# Patient Record
Sex: FEMALE | Race: WHITE | NOT HISPANIC OR LATINO
[De-identification: names, ages, dates, MRNs, and addresses within clinical notes are randomized per-mention and may not be internally consistent; named-entity substitution may affect disease eponyms.]

---

## 2017-07-18 ENCOUNTER — RX ONLY (RX ONLY)
Age: 77
End: 2017-07-18

## 2018-02-27 ENCOUNTER — APPOINTMENT (OUTPATIENT)
Dept: URBAN - METROPOLITAN AREA CLINIC 186 | Age: 78
Setting detail: DERMATOLOGY
End: 2018-02-27

## 2018-02-27 DIAGNOSIS — I87.2 VENOUS INSUFFICIENCY (CHRONIC) (PERIPHERAL): ICD-10-CM

## 2018-02-27 DIAGNOSIS — L98429 CHRONIC ULCER OF OTHER SPECIFIED SITES: ICD-10-CM

## 2018-02-27 DIAGNOSIS — L98419 CHRONIC ULCER OF OTHER SPECIFIED SITES: ICD-10-CM

## 2018-02-27 PROBLEM — L30.9 DERMATITIS, UNSPECIFIED: Status: ACTIVE | Noted: 2018-02-27

## 2018-02-27 PROBLEM — L97.529 NON-PRESSURE CHRONIC ULCER OF OTHER PART OF LEFT FOOT WITH UNSPECIFIED SEVERITY: Status: ACTIVE | Noted: 2018-02-27

## 2018-02-27 PROCEDURE — OTHER COUNSELING: OTHER

## 2018-02-27 PROCEDURE — OTHER ORDER TESTS: OTHER

## 2018-02-27 PROCEDURE — OTHER ADDITIONAL NOTES: OTHER

## 2018-02-27 PROCEDURE — 99213 OFFICE O/P EST LOW 20 MIN: CPT

## 2018-02-27 ASSESSMENT — LOCATION SIMPLE DESCRIPTION DERM
LOCATION SIMPLE: RIGHT PRETIBIAL REGION
LOCATION SIMPLE: LEFT 2ND TOE
LOCATION SIMPLE: LEFT PRETIBIAL REGION

## 2018-02-27 ASSESSMENT — LOCATION DETAILED DESCRIPTION DERM
LOCATION DETAILED: LEFT DORSAL 2ND TOE
LOCATION DETAILED: RIGHT DISTAL PRETIBIAL REGION
LOCATION DETAILED: LEFT DISTAL PRETIBIAL REGION

## 2018-02-27 ASSESSMENT — LOCATION ZONE DERM
LOCATION ZONE: TOE
LOCATION ZONE: LEG

## 2018-02-27 NOTE — PROCEDURE: ADDITIONAL NOTES
Additional Notes: I doubt this is a primary bullous disorder, She is complaining of burning pain, however has had neuropathy for years.  I am concerned about a possible secondary infection due to her diabetes, and lack of attention to her chronic pedal edema, and difficulty with daily cleaning of the sites.  She was counseled regrading the importance of washing and drying in between the toes.  I suggested she follow up with primary care physician addressing pedal edema, and neurology for chronic neuropathic pain.\\nI emphasized compression and wound care today.

## 2018-02-27 NOTE — HPI: RASH (STASIS DERMATITIS)
How Severe Is It?: severe
Is This A New Presentation, Or A Follow-Up?: Follow Up Stasis Dermatitis
Additional History: Wound care notes from [facility] wound care attached

## 2018-02-28 ENCOUNTER — RX ONLY (RX ONLY)
Age: 78
End: 2018-02-28

## 2018-02-28 RX ORDER — IODOQUINOL, HYDROCORTISONE ACETATE AND ALOE VERA LEAF 10; 20; 10 MG/G; MG/G; MG/G
1 APPLICATION GEL TOPICAL TWICE DAILY
Qty: 48 | Refills: 1 | Status: ERX

## 2018-03-20 ENCOUNTER — RX ONLY (RX ONLY)
Age: 78
End: 2018-03-20

## 2018-03-20 ENCOUNTER — APPOINTMENT (OUTPATIENT)
Dept: URBAN - METROPOLITAN AREA CLINIC 186 | Age: 78
Setting detail: DERMATOLOGY
End: 2018-03-20

## 2018-03-20 VITALS — HEIGHT: 64 IN | WEIGHT: 198 LBS

## 2018-03-20 DIAGNOSIS — I83019 VARICOSE VEINS OF LOWER EXTREMITIES WITH ULCER: ICD-10-CM

## 2018-03-20 DIAGNOSIS — I83009 VARICOSE VEINS OF LOWER EXTREMITIES WITH ULCER: ICD-10-CM

## 2018-03-20 DIAGNOSIS — I87.2 VENOUS INSUFFICIENCY (CHRONIC) (PERIPHERAL): ICD-10-CM

## 2018-03-20 DIAGNOSIS — I83029 VARICOSE VEINS OF LOWER EXTREMITIES WITH ULCER: ICD-10-CM

## 2018-03-20 PROBLEM — I83.025 VARICOSE VEINS OF LEFT LOWER EXTREMITY WITH ULCER OTHER PART OF FOOT: Status: ACTIVE | Noted: 2018-03-20

## 2018-03-20 PROBLEM — I83.11 VARICOSE VEINS OF RIGHT LOWER EXTREMITY WITH INFLAMMATION: Status: ACTIVE | Noted: 2018-03-20

## 2018-03-20 PROBLEM — I83.12 VARICOSE VEINS OF LEFT LOWER EXTREMITY WITH INFLAMMATION: Status: ACTIVE | Noted: 2018-03-20

## 2018-03-20 PROCEDURE — OTHER PRESCRIPTION: OTHER

## 2018-03-20 PROCEDURE — OTHER ADDITIONAL NOTES: OTHER

## 2018-03-20 PROCEDURE — OTHER COUNSELING: OTHER

## 2018-03-20 PROCEDURE — 99213 OFFICE O/P EST LOW 20 MIN: CPT

## 2018-03-20 PROCEDURE — OTHER ORDER TESTS: OTHER

## 2018-03-20 RX ORDER — SULFAMETHOXAZOLE AND TRIMETHOPRIM 800; 160 MG/1; MG/1
TABLET ORAL
Qty: 20 | Refills: 0 | Status: ERX

## 2018-03-20 ASSESSMENT — LOCATION ZONE DERM
LOCATION ZONE: TOE
LOCATION ZONE: LEG

## 2018-03-20 ASSESSMENT — LOCATION SIMPLE DESCRIPTION DERM
LOCATION SIMPLE: RIGHT ANKLE
LOCATION SIMPLE: LEFT PRETIBIAL REGION
LOCATION SIMPLE: LEFT 4TH TOE
LOCATION SIMPLE: LEFT 2ND TOE
LOCATION SIMPLE: LEFT 3RD TOE

## 2018-03-20 ASSESSMENT — LOCATION DETAILED DESCRIPTION DERM
LOCATION DETAILED: RIGHT ANKLE
LOCATION DETAILED: LEFT DORSAL 3RD TOE
LOCATION DETAILED: LEFT DORSAL 2ND TOE
LOCATION DETAILED: LEFT DISTAL PRETIBIAL REGION
LOCATION DETAILED: LEFT DORSAL 4TH TOE

## 2018-03-20 NOTE — PROCEDURE: ADDITIONAL NOTES
Additional Notes: She is not happy with her current wound clinic.  I suggested referral to [facility] wound care clinic Additional Notes: She is not happy with her current wound clinic.  I suggested referral to [facility] wound care clinic

## 2018-05-09 ENCOUNTER — APPOINTMENT (OUTPATIENT)
Dept: URBAN - METROPOLITAN AREA CLINIC 186 | Age: 78
Setting detail: DERMATOLOGY
End: 2018-05-09

## 2018-05-09 DIAGNOSIS — F42.4 EXCORIATION (SKIN-PICKING) DISORDER: ICD-10-CM

## 2018-05-09 PROBLEM — S50.912A UNSPECIFIED SUPERFICIAL INJURY OF LEFT FOREARM, INITIAL ENCOUNTER: Status: ACTIVE | Noted: 2018-05-09

## 2018-05-09 PROBLEM — S50.911A UNSPECIFIED SUPERFICIAL INJURY OF RIGHT FOREARM, INITIAL ENCOUNTER: Status: ACTIVE | Noted: 2018-05-09

## 2018-05-09 PROCEDURE — OTHER DIAGNOSIS COMMENT: OTHER

## 2018-05-09 PROCEDURE — OTHER PRESCRIPTION: OTHER

## 2018-05-09 PROCEDURE — 99213 OFFICE O/P EST LOW 20 MIN: CPT

## 2018-05-09 PROCEDURE — OTHER COUNSELING: OTHER

## 2018-05-09 RX ORDER — TRIAMCINOLONE ACETONIDE 1 MG/G
CREAM TOPICAL BID
Qty: 1 | Refills: 3 | Status: ERX

## 2018-05-09 ASSESSMENT — LOCATION DETAILED DESCRIPTION DERM
LOCATION DETAILED: RIGHT PROXIMAL DORSAL FOREARM
LOCATION DETAILED: LEFT PROXIMAL DORSAL FOREARM

## 2018-05-09 ASSESSMENT — LOCATION SIMPLE DESCRIPTION DERM
LOCATION SIMPLE: RIGHT FOREARM
LOCATION SIMPLE: LEFT FOREARM

## 2018-05-09 ASSESSMENT — LOCATION ZONE DERM: LOCATION ZONE: ARM

## 2018-05-09 NOTE — PROCEDURE: DIAGNOSIS COMMENT
Detail Level: Zone
Comment: Clinically numerous excoriations. No primary lesions present today.  Discussed with patient primary vs secondary.  Discussed ddx including papular eczema, drug eruption, arthropod folliculitis etc. discussed difficult to discern with current clinical. Discussed biopsy patient defers.  She stopped lyrics approx 2weeks ago.  Will switch cleansers and emollients and start TAC cream. Re-evaluate in 3-4 weeks if no improvement. ED if lips or tongue swelling and difficulty breathing

## 2018-07-20 ENCOUNTER — APPOINTMENT (OUTPATIENT)
Dept: URBAN - METROPOLITAN AREA CLINIC 186 | Age: 78
Setting detail: DERMATOLOGY
End: 2018-07-20

## 2018-07-20 VITALS — HEIGHT: 64.5 IN | WEIGHT: 198 LBS

## 2018-07-20 DIAGNOSIS — L82.1 OTHER SEBORRHEIC KERATOSIS: ICD-10-CM

## 2018-07-20 DIAGNOSIS — L28.1 PRURIGO NODULARIS: ICD-10-CM

## 2018-07-20 DIAGNOSIS — L81.4 OTHER MELANIN HYPERPIGMENTATION: ICD-10-CM

## 2018-07-20 DIAGNOSIS — L98419 CHRONIC ULCER OF OTHER SPECIFIED SITES: ICD-10-CM

## 2018-07-20 DIAGNOSIS — L98429 CHRONIC ULCER OF OTHER SPECIFIED SITES: ICD-10-CM

## 2018-07-20 DIAGNOSIS — D22 MELANOCYTIC NEVI: ICD-10-CM

## 2018-07-20 DIAGNOSIS — D18.0 HEMANGIOMA: ICD-10-CM

## 2018-07-20 DIAGNOSIS — Z85.828 PERSONAL HISTORY OF OTHER MALIGNANT NEOPLASM OF SKIN: ICD-10-CM

## 2018-07-20 DIAGNOSIS — L82.0 INFLAMED SEBORRHEIC KERATOSIS: ICD-10-CM

## 2018-07-20 PROBLEM — D22.5 MELANOCYTIC NEVI OF TRUNK: Status: ACTIVE | Noted: 2018-07-20

## 2018-07-20 PROBLEM — D18.01 HEMANGIOMA OF SKIN AND SUBCUTANEOUS TISSUE: Status: ACTIVE | Noted: 2018-07-20

## 2018-07-20 PROBLEM — L97.529 NON-PRESSURE CHRONIC ULCER OF OTHER PART OF LEFT FOOT WITH UNSPECIFIED SEVERITY: Status: ACTIVE | Noted: 2018-07-20

## 2018-07-20 PROBLEM — D22.61 MELANOCYTIC NEVI OF RIGHT UPPER LIMB, INCLUDING SHOULDER: Status: ACTIVE | Noted: 2018-07-20

## 2018-07-20 PROBLEM — D22.39 MELANOCYTIC NEVI OF OTHER PARTS OF FACE: Status: ACTIVE | Noted: 2018-07-20

## 2018-07-20 PROCEDURE — OTHER SUNSCREEN RECOMMENDATIONS: OTHER

## 2018-07-20 PROCEDURE — 99214 OFFICE O/P EST MOD 30 MIN: CPT | Mod: 25

## 2018-07-20 PROCEDURE — OTHER COUNSELING: OTHER

## 2018-07-20 PROCEDURE — OTHER REASSURANCE: OTHER

## 2018-07-20 PROCEDURE — OTHER TREATMENT REGIMEN: OTHER

## 2018-07-20 PROCEDURE — 17110 DESTRUCT B9 LESION 1-14: CPT

## 2018-07-20 PROCEDURE — OTHER DIAGNOSIS COMMENT: OTHER

## 2018-07-20 PROCEDURE — OTHER BENIGN DESTRUCTION: OTHER

## 2018-07-20 ASSESSMENT — LOCATION DETAILED DESCRIPTION DERM
LOCATION DETAILED: RIGHT INFERIOR CENTRAL MALAR CHEEK
LOCATION DETAILED: LEFT ANTERIOR PROXIMAL THIGH
LOCATION DETAILED: LEFT PLANTAR FOREFOOT OVERLYING 3RD METATARSAL
LOCATION DETAILED: UPPER STERNUM
LOCATION DETAILED: LEFT MEDIAL SUPERIOR CHEST
LOCATION DETAILED: LEFT BUTTOCK
LOCATION DETAILED: RIGHT LATERAL ABDOMEN
LOCATION DETAILED: LEFT MEDIAL PLANTAR 5TH TOE
LOCATION DETAILED: LEFT SUPERIOR UPPER BACK
LOCATION DETAILED: LEFT MID-UPPER BACK
LOCATION DETAILED: LEFT INFERIOR UPPER BACK
LOCATION DETAILED: RIGHT SUPERIOR UPPER BACK
LOCATION DETAILED: RIGHT ANTERIOR PROXIMAL UPPER ARM
LOCATION DETAILED: LEFT INFERIOR LATERAL UPPER BACK
LOCATION DETAILED: RIGHT PROXIMAL LATERAL POSTERIOR UPPER ARM
LOCATION DETAILED: LEFT ANTERIOR PROXIMAL UPPER ARM
LOCATION DETAILED: PERIUMBILICAL SKIN
LOCATION DETAILED: EPIGASTRIC SKIN
LOCATION DETAILED: RIGHT RIB CAGE
LOCATION DETAILED: RIGHT RIB CAGE
LOCATION DETAILED: LEFT ANTERIOR PROXIMAL THIGH
LOCATION DETAILED: RIGHT ANTERIOR DISTAL UPPER ARM
LOCATION DETAILED: LEFT PLANTAR FOREFOOT OVERLYING 2ND METATARSAL

## 2018-07-20 ASSESSMENT — LOCATION SIMPLE DESCRIPTION DERM
LOCATION SIMPLE: LEFT THIGH
LOCATION SIMPLE: LEFT UPPER BACK
LOCATION SIMPLE: RIGHT POSTERIOR UPPER ARM
LOCATION SIMPLE: RIGHT UPPER BACK
LOCATION SIMPLE: ABDOMEN
LOCATION SIMPLE: LEFT PLANTAR SURFACE
LOCATION SIMPLE: LEFT UPPER ARM
LOCATION SIMPLE: LEFT THIGH
LOCATION SIMPLE: RIGHT CHEEK
LOCATION SIMPLE: CHEST
LOCATION SIMPLE: PLANTAR SURFACE OF LEFT 5TH TOE
LOCATION SIMPLE: LEFT UPPER BACK
LOCATION SIMPLE: LEFT BUTTOCK
LOCATION SIMPLE: CHEST
LOCATION SIMPLE: ABDOMEN
LOCATION SIMPLE: RIGHT UPPER ARM

## 2018-07-20 ASSESSMENT — LOCATION ZONE DERM
LOCATION ZONE: ARM
LOCATION ZONE: LEG
LOCATION ZONE: FACE
LOCATION ZONE: TRUNK
LOCATION ZONE: LEG
LOCATION ZONE: TOE
LOCATION ZONE: FEET
LOCATION ZONE: TRUNK

## 2018-07-20 NOTE — HPI: EVALUATION OF SKIN LESION(S)
How Severe Are Your Spot(S)?: mild
Have Your Spot(S) Been Treated In The Past?: has not been treated
Hpi Title: Evaluation of a Skin Lesion
Hpi Title: Evaluation of Skin Lesions
How Severe Are Your Spot(S)?: moderate
Have Your Spot(S) Been Treated In The Past?: has been treated

## 2018-07-20 NOTE — PROCEDURE: DIAGNOSIS COMMENT
Comment: She is followed by [facility]’s Wound care clinic. Comment: She is followed by [facility]’s Wound care clinic.

## 2018-07-20 NOTE — PROCEDURE: BENIGN DESTRUCTION
Detail Level: Zone
Medical Necessity Clause: This procedure was medically necessary because the lesions that were treated were:
Anesthesia Volume In Cc: 0.5
Render Post-Care Instructions In Note?: no
Consent: The patient's consent was obtained including but not limited to risks of crusting, scabbing, blistering, scarring, darker or lighter pigmentary change, recurrence, incomplete removal and infection.
Post-Care Instructions: I reviewed with the patient in detail post-care instructions. Patient is to wear sunprotection, and avoid picking at any of the treated lesions. Pt may apply Vaseline to crusted or scabbing areas.
Total Number Of Lesions Treated: 2
Medical Necessity Information: It is in your best interest to select a reason for this procedure from the list below. All of these items fulfill various CMS LCD requirements except the new and changing color options.

## 2018-11-09 ENCOUNTER — APPOINTMENT (OUTPATIENT)
Dept: URBAN - METROPOLITAN AREA CLINIC 186 | Age: 78
Setting detail: DERMATOLOGY
End: 2018-11-09

## 2018-11-09 DIAGNOSIS — L28.1 PRURIGO NODULARIS: ICD-10-CM

## 2018-11-09 DIAGNOSIS — L82.0 INFLAMED SEBORRHEIC KERATOSIS: ICD-10-CM

## 2018-11-09 DIAGNOSIS — D18.0 HEMANGIOMA: ICD-10-CM

## 2018-11-09 DIAGNOSIS — L81.4 OTHER MELANIN HYPERPIGMENTATION: ICD-10-CM

## 2018-11-09 DIAGNOSIS — Z85.828 PERSONAL HISTORY OF OTHER MALIGNANT NEOPLASM OF SKIN: ICD-10-CM

## 2018-11-09 DIAGNOSIS — L82.1 OTHER SEBORRHEIC KERATOSIS: ICD-10-CM

## 2018-11-09 DIAGNOSIS — D22 MELANOCYTIC NEVI: ICD-10-CM

## 2018-11-09 DIAGNOSIS — L57.8 OTHER SKIN CHANGES DUE TO CHRONIC EXPOSURE TO NONIONIZING RADIATION: ICD-10-CM

## 2018-11-09 PROBLEM — D22.62 MELANOCYTIC NEVI OF LEFT UPPER LIMB, INCLUDING SHOULDER: Status: ACTIVE | Noted: 2018-11-09

## 2018-11-09 PROBLEM — D22.72 MELANOCYTIC NEVI OF LEFT LOWER LIMB, INCLUDING HIP: Status: ACTIVE | Noted: 2018-11-09

## 2018-11-09 PROBLEM — D18.01 HEMANGIOMA OF SKIN AND SUBCUTANEOUS TISSUE: Status: ACTIVE | Noted: 2018-11-09

## 2018-11-09 PROBLEM — D22.5 MELANOCYTIC NEVI OF TRUNK: Status: ACTIVE | Noted: 2018-11-09

## 2018-11-09 PROBLEM — D22.61 MELANOCYTIC NEVI OF RIGHT UPPER LIMB, INCLUDING SHOULDER: Status: ACTIVE | Noted: 2018-11-09

## 2018-11-09 PROBLEM — D22.71 MELANOCYTIC NEVI OF RIGHT LOWER LIMB, INCLUDING HIP: Status: ACTIVE | Noted: 2018-11-09

## 2018-11-09 PROCEDURE — OTHER BENIGN DESTRUCTION: OTHER

## 2018-11-09 PROCEDURE — OTHER PRESCRIPTION: OTHER

## 2018-11-09 PROCEDURE — 17110 DESTRUCT B9 LESION 1-14: CPT

## 2018-11-09 PROCEDURE — OTHER DEFER: OTHER

## 2018-11-09 PROCEDURE — 99214 OFFICE O/P EST MOD 30 MIN: CPT | Mod: 25

## 2018-11-09 PROCEDURE — OTHER TREATMENT REGIMEN: OTHER

## 2018-11-09 PROCEDURE — OTHER COUNSELING: OTHER

## 2018-11-09 RX ORDER — TRIAMCINOLONE ACETONIDE 1 MG/G
CREAM TOPICAL BID
Qty: 1 | Refills: 1 | Status: ERX

## 2018-11-09 ASSESSMENT — LOCATION DETAILED DESCRIPTION DERM
LOCATION DETAILED: RIGHT SUPERIOR LATERAL UPPER BACK
LOCATION DETAILED: LEFT MEDIAL SUPERIOR CHEST
LOCATION DETAILED: RIGHT PROXIMAL POSTERIOR UPPER ARM
LOCATION DETAILED: LEFT ANTERIOR PROXIMAL UPPER ARM
LOCATION DETAILED: RIGHT CENTRAL EYEBROW
LOCATION DETAILED: LEFT ANTERIOR DISTAL THIGH
LOCATION DETAILED: RIGHT ANTERIOR PROXIMAL UPPER ARM
LOCATION DETAILED: LEFT SUPERIOR MEDIAL UPPER BACK
LOCATION DETAILED: LEFT PROXIMAL DORSAL FOREARM
LOCATION DETAILED: RIGHT ANTERIOR PROXIMAL THIGH
LOCATION DETAILED: UPPER STERNUM
LOCATION DETAILED: EPIGASTRIC SKIN
LOCATION DETAILED: LEFT SUPERIOR LATERAL MIDBACK
LOCATION DETAILED: RIGHT LATERAL SUPERIOR CHEST
LOCATION DETAILED: RIGHT INFERIOR LATERAL NECK
LOCATION DETAILED: RIGHT PROXIMAL LATERAL POSTERIOR UPPER ARM
LOCATION DETAILED: LEFT DISTAL POSTERIOR UPPER ARM
LOCATION DETAILED: MIDDLE STERNUM
LOCATION DETAILED: INFERIOR THORACIC SPINE

## 2018-11-09 ASSESSMENT — LOCATION SIMPLE DESCRIPTION DERM
LOCATION SIMPLE: UPPER BACK
LOCATION SIMPLE: LEFT POSTERIOR UPPER ARM
LOCATION SIMPLE: LEFT UPPER BACK
LOCATION SIMPLE: LEFT UPPER ARM
LOCATION SIMPLE: RIGHT POSTERIOR UPPER ARM
LOCATION SIMPLE: CHEST
LOCATION SIMPLE: RIGHT UPPER ARM
LOCATION SIMPLE: LEFT FOREARM
LOCATION SIMPLE: RIGHT ANTERIOR NECK
LOCATION SIMPLE: RIGHT BACK
LOCATION SIMPLE: ABDOMEN
LOCATION SIMPLE: LEFT LOWER BACK
LOCATION SIMPLE: RIGHT THIGH
LOCATION SIMPLE: LEFT THIGH
LOCATION SIMPLE: RIGHT EYEBROW

## 2018-11-09 ASSESSMENT — LOCATION ZONE DERM
LOCATION ZONE: ARM
LOCATION ZONE: TRUNK
LOCATION ZONE: NECK
LOCATION ZONE: FACE
LOCATION ZONE: LEG

## 2018-12-03 ENCOUNTER — APPOINTMENT (OUTPATIENT)
Dept: URBAN - METROPOLITAN AREA CLINIC 186 | Age: 78
Setting detail: DERMATOLOGY
End: 2018-12-03

## 2018-12-03 DIAGNOSIS — L82.0 INFLAMED SEBORRHEIC KERATOSIS: ICD-10-CM

## 2018-12-03 PROCEDURE — OTHER COUNSELING: OTHER

## 2018-12-03 PROCEDURE — 17110 DESTRUCT B9 LESION 1-14: CPT

## 2018-12-03 PROCEDURE — OTHER CURETTAGE AND DESTRUCTION: OTHER

## 2018-12-03 ASSESSMENT — LOCATION DETAILED DESCRIPTION DERM
LOCATION DETAILED: LEFT SUPERIOR UPPER BACK
LOCATION DETAILED: LEFT INFERIOR LATERAL MIDBACK

## 2018-12-03 ASSESSMENT — LOCATION ZONE DERM: LOCATION ZONE: TRUNK

## 2018-12-03 ASSESSMENT — LOCATION SIMPLE DESCRIPTION DERM
LOCATION SIMPLE: LEFT LOWER BACK
LOCATION SIMPLE: LEFT UPPER BACK

## 2018-12-03 NOTE — PROCEDURE: CURETTAGE AND DESTRUCTION
Add 52 Modifier (Optional): no
Anesthesia Type: 1% lidocaine with 1:100,000 epinephrine and a 1:10 solution of 8.4% sodium bicarbonate
Body Location Override (Optional - Billing Will Still Be Based On Selected Body Map Location If Applicable): left lower back
Consent was obtained from the patient. The risks, benefits and alternatives to therapy were discussed in detail. Specifically, the risks of infection, scarring, bleeding, prolonged wound healing, nerve injury, incomplete removal, allergy to anesthesia and recurrence were addressed. Alternatives to ED&C, such as: surgical removal and XRT were also discussed.  Prior to the procedure, the treatment site was clearly identified and confirmed by the patient. All components of Universal Protocol/PAUSE Rule completed.
What Was Performed First?: Curettage
Number Of Curettages: 1
Cautery Type: drysol
Anesthesia Volume In Cc: 0.3
Body Location Override (Optional - Billing Will Still Be Based On Selected Body Map Location If Applicable): left upper back
Detail Level: Detailed
Additional Information: (Optional): The wound was cleaned, and a pressure dressing was applied.  The patient received detailed post-op instructions.
Size Of Lesion After Curettage: 0.2
Post-Care Instructions: I reviewed with the patient in detail post-care instructions. Patient is to keep the area dry for 48 hours, and not to engage in any swimming until the area is healed. Should the patient develop any fevers, chills, bleeding, severe pain patient will contact the office immediately.

## 2019-02-18 ENCOUNTER — APPOINTMENT (OUTPATIENT)
Dept: URBAN - METROPOLITAN AREA CLINIC 186 | Age: 79
Setting detail: DERMATOLOGY
End: 2019-02-18

## 2019-02-18 DIAGNOSIS — L20.84 INTRINSIC (ALLERGIC) ECZEMA: ICD-10-CM

## 2019-02-18 DIAGNOSIS — L30.3 INFECTIVE DERMATITIS: ICD-10-CM

## 2019-02-18 DIAGNOSIS — L71.0 PERIORAL DERMATITIS: ICD-10-CM

## 2019-02-18 DIAGNOSIS — I87.2 VENOUS INSUFFICIENCY (CHRONIC) (PERIPHERAL): ICD-10-CM

## 2019-02-18 PROBLEM — L08.9 LOCAL INFECTION OF THE SKIN AND SUBCUTANEOUS TISSUE, UNSPECIFIED: Status: ACTIVE | Noted: 2019-02-18

## 2019-02-18 PROCEDURE — OTHER ADDITIONAL NOTES: OTHER

## 2019-02-18 PROCEDURE — OTHER COUNSELING: OTHER

## 2019-02-18 PROCEDURE — OTHER PRESCRIPTION: OTHER

## 2019-02-18 PROCEDURE — 99214 OFFICE O/P EST MOD 30 MIN: CPT

## 2019-02-18 PROCEDURE — OTHER ORDER TESTS: OTHER

## 2019-02-18 PROCEDURE — OTHER TREATMENT REGIMEN: OTHER

## 2019-02-18 RX ORDER — METRONIDAZOLE 7.5 MG/G
CREAM TOPICAL
Qty: 1 | Refills: 1 | Status: ERX | COMMUNITY
Start: 2019-02-18

## 2019-02-18 RX ORDER — CEPHALEXIN 500 MG/1
CAPSULE ORAL
Qty: 20 | Refills: 0 | Status: ERX

## 2019-02-18 RX ORDER — TRIAMCINOLONE ACETONIDE 1 MG/G
CREAM TOPICAL BID
Qty: 1 | Refills: 3 | Status: ERX

## 2019-02-18 ASSESSMENT — LOCATION DETAILED DESCRIPTION DERM
LOCATION DETAILED: LEFT DORSAL GREAT TOE
LOCATION DETAILED: LEFT DISTAL PRETIBIAL REGION
LOCATION DETAILED: LEFT PROXIMAL PRETIBIAL REGION
LOCATION DETAILED: RIGHT MEDIAL SUPERIOR CHEST
LOCATION DETAILED: LEFT MEDIAL UPPER BACK
LOCATION DETAILED: LEFT MEDIAL BREAST 11-12:00 REGION
LOCATION DETAILED: RIGHT LOWER CUTANEOUS LIP
LOCATION DETAILED: RIGHT DISTAL DORSAL FOREARM
LOCATION DETAILED: RIGHT PROXIMAL PRETIBIAL REGION
LOCATION DETAILED: RIGHT SUPERIOR VERMILION LIP
LOCATION DETAILED: LEFT LOWER CUTANEOUS LIP
LOCATION DETAILED: RIGHT DISTAL PRETIBIAL REGION
LOCATION DETAILED: LEFT PROXIMAL DORSAL FOREARM

## 2019-02-18 ASSESSMENT — LOCATION SIMPLE DESCRIPTION DERM
LOCATION SIMPLE: LEFT PRETIBIAL REGION
LOCATION SIMPLE: LEFT GREAT TOE
LOCATION SIMPLE: RIGHT LIP
LOCATION SIMPLE: LEFT UPPER BACK
LOCATION SIMPLE: RIGHT PRETIBIAL REGION
LOCATION SIMPLE: LEFT FOREARM
LOCATION SIMPLE: RIGHT FOREARM
LOCATION SIMPLE: LEFT LIP
LOCATION SIMPLE: CHEST
LOCATION SIMPLE: LEFT BREAST

## 2019-02-18 ASSESSMENT — LOCATION ZONE DERM
LOCATION ZONE: LEG
LOCATION ZONE: TOE
LOCATION ZONE: TRUNK
LOCATION ZONE: ARM
LOCATION ZONE: LIP

## 2019-02-18 NOTE — HPI: RASH
What Type Of Note Output Would You Prefer (Optional)?: Bullet Format
How Severe Is Your Rash?: moderate
Is This A New Presentation, Or A Follow-Up?: Rash
Additional History: This area was being treated by wound clinic, they cultured it and patient states it came back as fungal. She was started on Ketoconazole 2% and Silver Sulfadiazine cream. Area still spreading and not getting better.

## 2019-02-18 NOTE — PROCEDURE: TREATMENT REGIMEN
Show Cetaphil Line: Yes
Detail Level: Zone
Action 4: Continue
Show Vanicream Line: No
Start Regimen: Triamcinolone twice daily for up to two weeks
Discontinue Regimen: Desonide
Start Regimen: Keflex twice daily x 10 days\\nTriamcinolone twice daily for 2 weeks

## 2019-02-21 ENCOUNTER — RX ONLY (RX ONLY)
Age: 79
End: 2019-02-21

## 2019-02-21 RX ORDER — DOXYCYCLINE HYCLATE 100 MG/1
1 TABLET TABLET, COATED ORAL TWICE DAILY
Qty: 20 | Refills: 0 | Status: ERX

## 2019-04-02 ENCOUNTER — APPOINTMENT (OUTPATIENT)
Dept: URBAN - METROPOLITAN AREA CLINIC 186 | Age: 79
Setting detail: DERMATOLOGY
End: 2019-04-02

## 2019-04-02 DIAGNOSIS — I87.2 VENOUS INSUFFICIENCY (CHRONIC) (PERIPHERAL): ICD-10-CM

## 2019-04-02 DIAGNOSIS — L30.3 INFECTIVE DERMATITIS: ICD-10-CM

## 2019-04-02 PROBLEM — L08.9 LOCAL INFECTION OF THE SKIN AND SUBCUTANEOUS TISSUE, UNSPECIFIED: Status: ACTIVE | Noted: 2019-04-02

## 2019-04-02 PROCEDURE — OTHER COUNSELING: OTHER

## 2019-04-02 PROCEDURE — OTHER TREATMENT REGIMEN: OTHER

## 2019-04-02 PROCEDURE — OTHER ORDER TESTS: OTHER

## 2019-04-02 PROCEDURE — 99213 OFFICE O/P EST LOW 20 MIN: CPT

## 2019-04-02 PROCEDURE — OTHER ADDITIONAL NOTES: OTHER

## 2019-04-02 ASSESSMENT — LOCATION SIMPLE DESCRIPTION DERM
LOCATION SIMPLE: LEFT GREAT TOE
LOCATION SIMPLE: RIGHT PRETIBIAL REGION
LOCATION SIMPLE: LEFT PRETIBIAL REGION

## 2019-04-02 ASSESSMENT — LOCATION DETAILED DESCRIPTION DERM
LOCATION DETAILED: LEFT DISTAL PRETIBIAL REGION
LOCATION DETAILED: RIGHT DISTAL PRETIBIAL REGION
LOCATION DETAILED: LEFT DORSAL GREAT TOE

## 2019-04-02 ASSESSMENT — LOCATION ZONE DERM
LOCATION ZONE: LEG
LOCATION ZONE: TOE

## 2019-04-02 NOTE — PROCEDURE: TREATMENT REGIMEN
Plan: Patient to start domebro soaks. Dissolve 1-2 packets in 16oz of water and stir the solution until desolved. 2) Do not strain filter the solutiin.  3) saturate a clean, soft, thin cloth in solution (i.e. handkerchief or sheet or cheese cloth); gently squeeze and apply to affected area. 4) saturate cloth in solution every 15-30 minutes and apply to affected area. 5) discard after use.  6) repeat twice daily for 5-7 days Other Instructions: Patient to start domebro soaks. Dissolve 1-2 packets in 16oz of water and stir the solution until desolved. 2) Do not strain filter the solutiin.  3) saturate a clean, soft, thin cloth in solution (i.e. handkerchief or sheet or cheese cloth); gently squeeze and apply to affected area. 4) saturate cloth in solution every 15-30 minutes and apply to affected area. 5) discard after use.  6) repeat twice daily for 5-7 days

## 2019-04-02 NOTE — PROCEDURE: ADDITIONAL NOTES
Additional Notes: Discussed evaluation by infectious disease. Patient states her blood sugar was 120 mg/dL this morning and her last HgbA1c was 7.4
Detail Level: Simple

## 2019-04-05 ENCOUNTER — RX ONLY (RX ONLY)
Age: 79
End: 2019-04-05

## 2019-04-05 RX ORDER — DOXYCYCLINE HYCLATE 100 MG/1
100 MG CAPSULE, GELATIN COATED ORAL
Qty: 20 | Refills: 0 | Status: ERX

## 2019-04-08 ENCOUNTER — RX ONLY (RX ONLY)
Age: 79
End: 2019-04-08

## 2019-04-08 RX ORDER — DOXYCYCLINE HYCLATE 100 MG/1
1 TABLET TABLET, COATED ORAL TWICE DAILY
Qty: 16 | Refills: 0 | Status: ERX

## 2020-01-30 ENCOUNTER — APPOINTMENT (OUTPATIENT)
Dept: URBAN - METROPOLITAN AREA CLINIC 186 | Age: 80
Setting detail: DERMATOLOGY
End: 2020-01-30

## 2020-01-30 DIAGNOSIS — T07XXXA ABRASION OR FRICTION BURN OF OTHER, MULTIPLE, AND UNSPECIFIED SITES, WITHOUT MENTION OF INFECTION: ICD-10-CM

## 2020-01-30 DIAGNOSIS — L30.4 ERYTHEMA INTERTRIGO: ICD-10-CM

## 2020-01-30 PROBLEM — L30.9 DERMATITIS, UNSPECIFIED: Status: ACTIVE | Noted: 2020-01-30

## 2020-01-30 PROCEDURE — OTHER MIPS QUALITY: OTHER

## 2020-01-30 PROCEDURE — OTHER PRESCRIPTION: OTHER

## 2020-01-30 PROCEDURE — OTHER COUNSELING: OTHER

## 2020-01-30 PROCEDURE — 99213 OFFICE O/P EST LOW 20 MIN: CPT

## 2020-01-30 PROCEDURE — OTHER DIAGNOSIS COMMENT: OTHER

## 2020-01-30 PROCEDURE — OTHER TREATMENT REGIMEN: OTHER

## 2020-01-30 RX ORDER — KETOCONAZOLE 20 MG/G
CREAM TOPICAL
Qty: 1 | Refills: 5 | Status: ERX | COMMUNITY
Start: 2020-01-30

## 2020-01-30 ASSESSMENT — LOCATION DETAILED DESCRIPTION DERM
LOCATION DETAILED: LEFT DORSAL FOOT
LOCATION DETAILED: LEFT SUPRAPUBIC SKIN

## 2020-01-30 ASSESSMENT — LOCATION ZONE DERM
LOCATION ZONE: TRUNK
LOCATION ZONE: FEET

## 2020-01-30 ASSESSMENT — LOCATION SIMPLE DESCRIPTION DERM
LOCATION SIMPLE: GROIN
LOCATION SIMPLE: LEFT FOOT

## 2020-01-30 NOTE — PROCEDURE: TREATMENT REGIMEN
Plan: Vaseline and bandaid daily as well
Initiate Treatment: Ketoconazole cream 2x daily for 2 weeks
Detail Level: Zone

## 2020-01-30 NOTE — HPI: RASH
What Type Of Note Output Would You Prefer (Optional)?: Bullet Format
How Severe Is Your Rash?: moderate
Is This A New Presentation, Or A Follow-Up?: Rash
Additional History: Patient states on1/13/20 she saw her podiatrist , Dr. Costa and she had a staph infection on her left 3rdtoe. Was given sulfamethoxazole. Patient using Ketoconazole cream on the area. Using dial soap. States genitalia and inguinal area is red and has an odor. Patient started soliqua for diabetes mellitus 1 week ago. Patient states she has started eating yogurt and concerned about a yeast infection.
Additional History: The patient had this area previously biopsied with positive staph results. Recent hospital stay reported with IV antibiotics

## 2020-01-30 NOTE — PROCEDURE: DIAGNOSIS COMMENT
Detail Level: Detailed
Comment: I suspect  this is due to the antibiotics taken for her pneumonia this fall, and recent tmp-smx for her foot and history of diabetes.\\nPatient was advised o use ketoconazole cream on all sites, including her foot.
Comment: She was given tmp-smx due to this  open wound.  I suspect this is secondary to tinea pedis.  She was advised to use ketoconazole cream twice daily in addition to wound care.

## 2020-09-09 ENCOUNTER — APPOINTMENT (OUTPATIENT)
Dept: URBAN - METROPOLITAN AREA CLINIC 186 | Age: 80
Setting detail: DERMATOLOGY
End: 2020-09-09

## 2020-09-09 ENCOUNTER — RX ONLY (RX ONLY)
Age: 80
End: 2020-09-09

## 2020-09-09 DIAGNOSIS — L30.4 ERYTHEMA INTERTRIGO: ICD-10-CM

## 2020-09-09 DIAGNOSIS — L82.1 OTHER SEBORRHEIC KERATOSIS: ICD-10-CM

## 2020-09-09 PROCEDURE — OTHER PRESCRIPTION: OTHER

## 2020-09-09 PROCEDURE — OTHER DIAGNOSIS COMMENT: OTHER

## 2020-09-09 PROCEDURE — OTHER REASSURANCE: OTHER

## 2020-09-09 PROCEDURE — OTHER TREATMENT REGIMEN: OTHER

## 2020-09-09 PROCEDURE — OTHER COUNSELING: OTHER

## 2020-09-09 PROCEDURE — 99213 OFFICE O/P EST LOW 20 MIN: CPT

## 2020-09-09 RX ORDER — KETOCONAZOLE 20 MG/G
CREAM TOPICAL
Qty: 1 | Refills: 5 | Status: ERX

## 2020-09-09 RX ORDER — NYSTATIN 100000 [USP'U]/G
POWDER TOPICAL
Qty: 1 | Refills: 11 | Status: ERX | COMMUNITY
Start: 2020-09-09

## 2020-09-09 ASSESSMENT — LOCATION DETAILED DESCRIPTION DERM
LOCATION DETAILED: LEFT PROXIMAL PRETIBIAL REGION
LOCATION DETAILED: LEFT DISTAL DORSAL FOREARM
LOCATION DETAILED: LEFT DISTAL PRETIBIAL REGION
LOCATION DETAILED: LEFT SUPRAPUBIC SKIN

## 2020-09-09 ASSESSMENT — LOCATION ZONE DERM
LOCATION ZONE: ARM
LOCATION ZONE: LEG
LOCATION ZONE: TRUNK

## 2020-09-09 ASSESSMENT — LOCATION SIMPLE DESCRIPTION DERM
LOCATION SIMPLE: LEFT FOREARM
LOCATION SIMPLE: GROIN
LOCATION SIMPLE: LEFT PRETIBIAL REGION

## 2020-09-09 NOTE — PROCEDURE: TREATMENT REGIMEN
Detail Level: Zone
Initiate Treatment: Nystatin
Plan: Recommended lotions to soften
Continue Regimen: Ketoconazole
Plan: Patient states she is a diabetic, discussed how this can worsen symptoms.
Otc Regimen: Carpe antiperspirant

## 2020-09-09 NOTE — PROCEDURE: DIAGNOSIS COMMENT
Detail Level: Detailed
Comment: Discussed possible causes for exacerbation.  She is diabetic, has had weight gain and Hbg A!C’s of > 7%.  She has also been working outside in the heat.  I suggested we add nystatin powder in addition to antiperspirants and ketoconazole cream when this is flared.

## 2021-02-25 ENCOUNTER — APPOINTMENT (OUTPATIENT)
Dept: URBAN - METROPOLITAN AREA CLINIC 186 | Age: 81
Setting detail: DERMATOLOGY
End: 2021-02-25

## 2021-02-25 VITALS — TEMPERATURE: 98.1 F

## 2021-02-25 DIAGNOSIS — R21 RASH AND OTHER NONSPECIFIC SKIN ERUPTION: ICD-10-CM

## 2021-02-25 DIAGNOSIS — L21.8 OTHER SEBORRHEIC DERMATITIS: ICD-10-CM

## 2021-02-25 PROBLEM — L30.9 DERMATITIS, UNSPECIFIED: Status: ACTIVE | Noted: 2021-02-25

## 2021-02-25 PROCEDURE — OTHER COUNSELING: OTHER

## 2021-02-25 PROCEDURE — OTHER PRESCRIPTION: OTHER

## 2021-02-25 PROCEDURE — OTHER KOH PREP: OTHER

## 2021-02-25 PROCEDURE — OTHER MIPS QUALITY: OTHER

## 2021-02-25 PROCEDURE — OTHER PRESCRIPTION MEDICATION MANAGEMENT: OTHER

## 2021-02-25 PROCEDURE — OTHER ADDITIONAL NOTES: OTHER

## 2021-02-25 PROCEDURE — 99214 OFFICE O/P EST MOD 30 MIN: CPT

## 2021-02-25 RX ORDER — KETOCONAZOLE 20 MG/ML
SHAMPOO, SUSPENSION TOPICAL BIW
Qty: 3 | Refills: 5 | Status: ERX | COMMUNITY
Start: 2021-02-25

## 2021-02-25 RX ORDER — CLOBETASOL PROPIONATE 0.5 MG/ML
SOLUTION TOPICAL
Qty: 1 | Refills: 2 | Status: ERX | COMMUNITY
Start: 2021-02-25

## 2021-02-25 RX ORDER — KETOCONAZOLE 20 MG/G
CREAM TOPICAL
Qty: 1 | Refills: 2 | Status: ERX

## 2021-02-25 ASSESSMENT — LOCATION DETAILED DESCRIPTION DERM
LOCATION DETAILED: RIGHT LATERAL BREAST 6-7:00 REGION
LOCATION DETAILED: MID-FRONTAL SCALP

## 2021-02-25 ASSESSMENT — LOCATION ZONE DERM
LOCATION ZONE: TRUNK
LOCATION ZONE: SCALP

## 2021-02-25 ASSESSMENT — LOCATION SIMPLE DESCRIPTION DERM
LOCATION SIMPLE: ANTERIOR SCALP
LOCATION SIMPLE: RIGHT BREAST

## 2021-02-25 NOTE — PROCEDURE: ADDITIONAL NOTES
Detail Level: Simple
Additional Notes: Anne-Marie has poorly controlled diabetes.  I suggested she start with ketoconazole cream bid to affected area.
Additional Notes: Patient is a diabetic
Render Risk Assessment In Note?: no

## 2021-02-25 NOTE — HPI: ITCHING (SCALP)
How Did The Scalp Condition Occur?: sudden in onset (over a period of weeks to a few months)
How Severe Is The Scalp Condition?: mild
Additional History: Patient had itchy scalp anfew months ago. Patient fell and had staples in her head and ever since then the itching has been worse.

## 2021-02-25 NOTE — PROCEDURE: KOH PREP
Detail Level: Detailed
Cpt Desired: 
Showing: fungal hyphal elements: negative
Koh Procedure Text (Tissue Harvesting Technique): A 15-blade scalpel was used to scrape the skin. The skin scrapings were placed on a glass slide, covered with a coverslip and a KOH solution was applied.
Koh Intro Text (From The.....): A KOH prep was ordered and evaluated from the

## 2021-02-25 NOTE — PROCEDURE: PRESCRIPTION MEDICATION MANAGEMENT
Detail Level: Zone
Initiate Treatment: Ketoconazole cream
Render In Strict Bullet Format?: No
Initiate Treatment: Ketoconazole shampoo \\nClobetasol solution

## 2021-09-08 ENCOUNTER — RX ONLY (RX ONLY)
Age: 81
End: 2021-09-08

## 2021-09-08 RX ORDER — KETOCONAZOLE 20 MG/ML
SHAMPOO, SUSPENSION TOPICAL
Qty: 120 | Refills: 2 | Status: ERX

## 2021-09-08 RX ORDER — CLOBETASOL PROPIONATE 0.5 MG/ML
SOLUTION TOPICAL
Qty: 100 | Refills: 2 | Status: ERX

## 2022-01-24 ENCOUNTER — RX ONLY (RX ONLY)
Age: 82
End: 2022-01-24

## 2022-01-24 RX ORDER — KETOCONAZOLE 20 MG/ML
SHAMPOO, SUSPENSION TOPICAL
Qty: 120 | Refills: 2 | Status: ERX

## 2022-02-01 ENCOUNTER — RX ONLY (RX ONLY)
Age: 82
End: 2022-02-01

## 2022-02-01 RX ORDER — KETOCONAZOLE 20 MG/ML
SHAMPOO, SUSPENSION TOPICAL
Qty: 120 | Refills: 2 | Status: ERX

## 2022-02-08 ENCOUNTER — RX ONLY (RX ONLY)
Age: 82
End: 2022-02-08

## 2022-02-08 RX ORDER — KETOCONAZOLE 20 MG/G
CREAM TOPICAL
Qty: 60 | Refills: 1 | Status: ERX

## 2022-10-26 ENCOUNTER — APPOINTMENT (OUTPATIENT)
Dept: URBAN - METROPOLITAN AREA CLINIC 186 | Age: 82
Setting detail: DERMATOLOGY
End: 2022-10-26

## 2022-10-26 ENCOUNTER — RX ONLY (RX ONLY)
Age: 82
End: 2022-10-26

## 2022-10-26 DIAGNOSIS — D485 NEOPLASM OF UNCERTAIN BEHAVIOR OF SKIN: ICD-10-CM

## 2022-10-26 DIAGNOSIS — L21.8 OTHER SEBORRHEIC DERMATITIS: ICD-10-CM

## 2022-10-26 PROBLEM — D48.5 NEOPLASM OF UNCERTAIN BEHAVIOR OF SKIN: Status: ACTIVE | Noted: 2022-10-26

## 2022-10-26 PROBLEM — L30.9 DERMATITIS, UNSPECIFIED: Status: ACTIVE | Noted: 2022-10-26

## 2022-10-26 PROBLEM — E08.40 DIABETES MELLITUS DUE TO UNDERLYING CONDITION WITH DIABETIC NEUROPATHY, UNSPECIFIED: Status: ACTIVE | Noted: 2022-10-26

## 2022-10-26 PROCEDURE — OTHER PRESCRIPTION MEDICATION MANAGEMENT: OTHER

## 2022-10-26 PROCEDURE — 99214 OFFICE O/P EST MOD 30 MIN: CPT | Mod: 25

## 2022-10-26 PROCEDURE — OTHER COUNSELING: OTHER

## 2022-10-26 PROCEDURE — OTHER ADDITIONAL NOTES: OTHER

## 2022-10-26 PROCEDURE — 11102 TANGNTL BX SKIN SINGLE LES: CPT

## 2022-10-26 PROCEDURE — OTHER MIPS QUALITY: OTHER

## 2022-10-26 PROCEDURE — OTHER BIOPSY BY SHAVE METHOD: OTHER

## 2022-10-26 PROCEDURE — OTHER DIAGNOSIS COMMENT: OTHER

## 2022-10-26 RX ORDER — KETOCONAZOLE 20 MG/ML
SHAMPOO, SUSPENSION TOPICAL
Qty: 120 | Refills: 11 | Status: ERX

## 2022-10-26 RX ORDER — CLOBETASOL PROPIONATE 0.5 MG/ML
SOLUTION TOPICAL
Qty: 100 | Refills: 3 | Status: ERX

## 2022-10-26 ASSESSMENT — LOCATION ZONE DERM
LOCATION ZONE: SCALP
LOCATION ZONE: FACE

## 2022-10-26 ASSESSMENT — LOCATION DETAILED DESCRIPTION DERM
LOCATION DETAILED: MID-FRONTAL SCALP
LOCATION DETAILED: LEFT CENTRAL MANDIBULAR CHEEK

## 2022-10-26 ASSESSMENT — LOCATION SIMPLE DESCRIPTION DERM
LOCATION SIMPLE: ANTERIOR SCALP
LOCATION SIMPLE: LEFT CHEEK

## 2022-10-26 NOTE — PROCEDURE: DIAGNOSIS COMMENT
Detail Level: Simple
Render Risk Assessment In Note?: no
Comment: Severe scalp pruritus, unclear etiology. I suggested clobetasol  solution. Ketoconazole shampoo, if not improved to consider punch biopsy, intralesional kenalog. Discussed with patient.

## 2022-10-26 NOTE — PROCEDURE: ADDITIONAL NOTES
Detail Level: Simple
Additional Notes: Follow up with neurology.
Render Risk Assessment In Note?: no

## 2022-10-26 NOTE — PROCEDURE: COUNSELING
Detail Level: Zone
Detail Level: Detailed
Patient Specific Counseling (Will Not Stick From Patient To Patient): Follow up with neurology.

## 2022-10-26 NOTE — PROCEDURE: PRESCRIPTION MEDICATION MANAGEMENT
Initiate Treatment: Clobetasol solution
Detail Level: Zone
Continue Regimen: Ketoconazole shampoo
Render In Strict Bullet Format?: No

## 2023-01-12 ENCOUNTER — APPOINTMENT (OUTPATIENT)
Dept: URBAN - METROPOLITAN AREA CLINIC 186 | Age: 83
Setting detail: DERMATOLOGY
End: 2023-01-12

## 2023-01-12 DIAGNOSIS — L82.1 OTHER SEBORRHEIC KERATOSIS: ICD-10-CM

## 2023-01-12 PROBLEM — C44.319 BASAL CELL CARCINOMA OF SKIN OF OTHER PARTS OF FACE: Status: ACTIVE | Noted: 2023-01-12

## 2023-01-12 PROCEDURE — OTHER COUNSELING: OTHER

## 2023-01-12 PROCEDURE — 11642 EXC F/E/E/N/L MAL+MRG 1.1-2: CPT

## 2023-01-12 PROCEDURE — OTHER EXCISION: OTHER

## 2023-01-12 PROCEDURE — 99212 OFFICE O/P EST SF 10 MIN: CPT | Mod: 25

## 2023-01-12 ASSESSMENT — LOCATION DETAILED DESCRIPTION DERM
LOCATION DETAILED: LEFT ELBOW
LOCATION DETAILED: LEFT VENTRAL PROXIMAL FOREARM

## 2023-01-12 ASSESSMENT — LOCATION SIMPLE DESCRIPTION DERM
LOCATION SIMPLE: LEFT FOREARM
LOCATION SIMPLE: LEFT ELBOW

## 2023-01-12 ASSESSMENT — LOCATION ZONE DERM: LOCATION ZONE: ARM

## 2023-01-12 NOTE — PROCEDURE: EXCISION
GI Follow-up Note  12/2/2021    PATIENT:  Shan Qiu  MRN:  [id number]  ADMIT DATE: 11/29/2021  HOSPITAL STAY:   LOS: 3 days   REASON FOR HOSPITALIZATION:  fall    Subjective:  Pt started on IV heparin for DVT. Pt reports some leg pain. No SOB.     Objective:    I/O's  No intake or output data in the 24 hours ending 12/02/21 1114    Physical Exam:  Vital Signs: /81 (BP Location: LUE - Left upper extremity, Patient Position: Left side lying)   Pulse 84   Temp 98 °F (36.7 °C) (Skin)   Resp 17   Ht 6' 3\" (1.905 m)   Wt 84.4 kg (186 lb)   BMI 23.25 kg/m²   BSA 2.13 m²   General: The patient is well developed, well nourished, in no acute distress.   Head: Normocephalic. Atraumatic.   Eyes: Normal conjunctivae and sclerae.  PERRL.  EOMI.  ENT: Oropharynx clear, moist mucous membranes.  Neck: Symmetric without swelling or tenderness.  No masses.  Cardiovascular: Regular rate and rhythm. No murmurs.   Respiratory: Respiratory effort normal. CTA.    Gastrointestinal:  +BS, soft, non-tender, mild distention. No HSM. No masses.    Neurologic: Normal mood and affect. CN 2-12 grossly intact.   Skin: Warm and dry. No rashes.     Labs   CBC:   Lab Results   Component Value Date    WBC 7.9 12/02/2021    WBC 6.1 02/21/2020    RBC 4.76 12/02/2021    RBC 4.84 02/21/2020    HGB 12.7 (L) 12/02/2021    HGB 12.7 (L) 02/21/2020    HCT 39.3 12/02/2021    HCT 41.5 02/21/2020    MCV 82.6 12/02/2021    MCV 85.7 02/21/2020     12/02/2021     (L) 02/21/2020     CMP:    Recent Labs   Lab 12/02/21  0716 11/30/21  0638 11/29/21  1917   SODIUM 140   < > 140   POTASSIUM 4.0   < > 3.8   CHLORIDE 104   < > 105   CO2 29   < > 28   BUN 17   < > 19   CREATININE 0.95   < > 0.85   CALCIUM 8.4   < > 8.6   ALBUMIN  --   --  2.6*   BILIRUBIN  --   --  0.7   ALKPT  --   --  87   GPT  --   --  23   AST  --   --  46*   GLUCOSE 137*   < > 143*    < > = values in this interval not displayed.          Imaging      CT ABDOMEN PELVIS WO  CONTRAST    Result Date: 12/1/2021  EXAM: CT ABDOMEN PELVIS WO CONTRAST CLINICAL INDICATION: Abdominal distention. COMPARISON: CT abdomen/pelvis on 11/30/2021 TECHNIQUE:  CT of the abdomen and pelvis was performed utilizing standard protocol without administration of intravenous contrast material.   Axial, sagittal and coronal imaging planes were utilized.  CONTRAST:  No IV contrast was administered. Oral contrast: Was given FINDINGS: The lack of intravenous contrast makes the evaluation of visceral organ suboptimal, respectively. Lung bases: Calcified granulomas in the lungs Interval development of diffuse dilatation of the colonic loops with transition point identified at the sigmoid colon with decompressed distal sigmoid colon.  There is a somewhat bird's peak appearance of the sigmoid colon (series 2, images 62-68).  The colon appears diffusely redundant. There is also possible transition seen at the descending colon.  Dilatation is also seen proximally in the ascending colon, cecum, and the transverse colon.  Contrast is still seen in the proximal colon.  Please note transverse colon is dilated up to 7.0 cm in the ascending colon is dilated up to 6.3 cm. Liver: Unremarkable Pancreas: Unremarkable Spleen: Unremarkable Adrenal Glands: Unremarkable Gallbladder:Present. Appendix is visualized and unremarkable.  Kidneys: No obstructive uropathy. Hypodense lesions seen in the lower pole of the right kidney. Urinary bladder: Grossly unremarkable OSSEOUS STRUCTURES: Diffuse heterogeneous or mottled appearance of the spine with multiple lytic areas identified most pronounced at L3, L4 and L5. Other findings: Atherosclerosis of the abdominal aorta through the peripheral vasculature..  Other findings: Patulous appearance of the distal esophagus which can be seen in reflux.     1.   Limited examination without intravenous contrast. 2.   Interval development of diffuse dilatation of large portions of the colon with  transition seen at the sigmoid colon and a second possible area of transition also seen at the descending colon.  Findings are concerning for developing or early colonic obstruction with developing or partially obstructed sigmoid volvulus in the differential (i.e. there is a somewhat bird's peak appearance of the sigmoid colon (please refer to series 2, images 62-68)).  Contrast is still seen in the proximal colon.  Findings notified via perfect serve message to Dr. Ant Leigh and KEVIN Mendoza at 11:24 PM on 12/01/2021. 3.   Diffuse heterogeneous or mottled appearance of the spine.  Underlying infiltrative process cannot be excluded.  Recommend further evaluation with bone scan examination. 4.   Findings may represent renal cysts in the right kidney.  Recommend nonemergent renal ultrasound for confirmation and to exclude other etiologies. 5.   Otherwise no significant change. FOR PHYSICIAN USE ONLY - Please note that this report was generated using voice recognition software.  If you require clarification or feel that there has been an error in this report please contact me through Perfect Serve.  Thank you very much for allowing me to participate in the care of your patient. Electronically Signed by: ROMEL QUIGLEY Signed on: 12/1/2021 11:37 PM       Assessment/Plan:  89M with a h/o DM and HTN p/w generalized weakness.  Patient is a poor historian oriented x1.  HPI obtained from chart review. Original CT with abnormal distention but no clear obstruction. Serial x-ray shows contrast only got to splenic flexure. Radiology recommended CT rather than LGI study. CT last night showed possible volvulus.     1. Possible volvulus  2. Heparin gtt for DVT  I spoke with the patient's POA and explained rationale for flex sig today. Hopefully, we can find out if there is a volvulus and treat it. Pt at increased risk of complications with acute DVT and heparin gtt. Given that we are not expected to do any therapeutics that  involve cutting, it's acceptable to proceed while on heparin. D/w pt's POA all of the above. She agreed to proceed and suspend DNR orders for the procedure.     The risks of endoscopy/EGD/colonoscopy (including the potentially life threatening risks of bleeding, perforation, missed lesions, infection and complications due to sedation), benefits, and alternatives were discussed with the patient in detail. The patient expressed understanding and agrees to proceed.        Ant Leigh MD  12/2/2021     Complex Repair And Xenograft Text: The defect edges were debeveled with a #15 scalpel blade.  The primary defect was closed partially with a complex linear closure.  Given the location of the defect, shape of the defect and the proximity to free margins a xenograft was deemed most appropriate to repair the remaining defect.  The graft was trimmed to fit the size of the remaining defect.  The graft was then placed in the primary defect, oriented appropriately, and sutured into place.

## 2023-01-12 NOTE — PROCEDURE: EXCISION
Interpolation Flap Text: A decision was made to reconstruct the defect utilizing an interpolation axial flap and a staged reconstruction.  A telfa template was made of the defect.  This telfa template was then used to outline the interpolation flap.  The donor area for the pedicle flap was then injected with anesthesia.  The flap was excised through the skin and subcutaneous tissue down to the layer of the underlying musculature.  The interpolation flap was carefully excised within this deep plane to maintain its blood supply.  The edges of the donor site were undermined.   The donor site was closed in a primary fashion.  The pedicle was then rotated into position and sutured.  Once the tube was sutured into place, adequate blood supply was confirmed with blanching and refill.  The pedicle was then wrapped with xeroform gauze and dressed appropriately with a telfa and gauze bandage to ensure continued blood supply and protect the attached pedicle. Valtrex Pregnancy And Lactation Text: this medication is Pregnancy Category B and is considered safe during pregnancy. This medication is not directly found in breast milk but it's metabolite acyclovir is present.

## 2023-01-19 ENCOUNTER — APPOINTMENT (OUTPATIENT)
Dept: URBAN - METROPOLITAN AREA CLINIC 186 | Age: 83
Setting detail: DERMATOLOGY
End: 2023-01-19

## 2023-01-19 DIAGNOSIS — Z48.02 ENCOUNTER FOR REMOVAL OF SUTURES: ICD-10-CM

## 2023-01-19 PROCEDURE — OTHER SUTURE REMOVAL (GLOBAL PERIOD): OTHER

## 2023-01-19 ASSESSMENT — LOCATION DETAILED DESCRIPTION DERM: LOCATION DETAILED: LEFT CENTRAL BUCCAL CHEEK

## 2023-01-19 ASSESSMENT — PAIN INTENSITY VAS: HOW INTENSE IS YOUR PAIN 0 BEING NO PAIN, 10 BEING THE MOST SEVERE PAIN POSSIBLE?: NO PAIN

## 2023-01-19 ASSESSMENT — LOCATION ZONE DERM: LOCATION ZONE: FACE

## 2023-01-19 ASSESSMENT — LOCATION SIMPLE DESCRIPTION DERM: LOCATION SIMPLE: LEFT CHEEK

## 2023-01-19 NOTE — PROCEDURE: SUTURE REMOVAL (GLOBAL PERIOD)
Body Location Override (Optional - Billing Will Still Be Based On Selected Body Map Location If Applicable): left central mandibular cheek
Detail Level: Detailed
Add 91642 Cpt? (Important Note: In 2017 The Use Of 11342 Is Being Tracked By Cms To Determine Future Global Period Reimbursement For Global Periods): no

## 2023-05-16 ENCOUNTER — APPOINTMENT (OUTPATIENT)
Dept: URBAN - METROPOLITAN AREA CLINIC 186 | Age: 83
Setting detail: DERMATOLOGY
End: 2023-05-16

## 2023-05-16 DIAGNOSIS — Z85.828 PERSONAL HISTORY OF OTHER MALIGNANT NEOPLASM OF SKIN: ICD-10-CM

## 2023-05-16 DIAGNOSIS — Z71.89 OTHER SPECIFIED COUNSELING: ICD-10-CM

## 2023-05-16 DIAGNOSIS — L91.0 HYPERTROPHIC SCAR: ICD-10-CM

## 2023-05-16 DIAGNOSIS — L85.3 XEROSIS CUTIS: ICD-10-CM

## 2023-05-16 PROCEDURE — OTHER GENTLE SKIN CARE INSTRUCTIONS: OTHER

## 2023-05-16 PROCEDURE — 99213 OFFICE O/P EST LOW 20 MIN: CPT

## 2023-05-16 PROCEDURE — OTHER SUNSCREEN RECOMMENDATIONS: OTHER

## 2023-05-16 PROCEDURE — OTHER COUNSELING: OTHER

## 2023-05-16 PROCEDURE — OTHER MIPS QUALITY: OTHER

## 2023-05-16 PROCEDURE — OTHER ADDITIONAL NOTES: OTHER

## 2023-05-16 ASSESSMENT — LOCATION DETAILED DESCRIPTION DERM
LOCATION DETAILED: LEFT DISTAL PRETIBIAL REGION
LOCATION DETAILED: LEFT CENTRAL BUCCAL CHEEK
LOCATION DETAILED: RIGHT DISTAL PRETIBIAL REGION

## 2023-05-16 ASSESSMENT — LOCATION SIMPLE DESCRIPTION DERM
LOCATION SIMPLE: LEFT CHEEK
LOCATION SIMPLE: RIGHT PRETIBIAL REGION
LOCATION SIMPLE: LEFT PRETIBIAL REGION

## 2023-05-16 ASSESSMENT — LOCATION ZONE DERM
LOCATION ZONE: LEG
LOCATION ZONE: FACE

## 2023-05-16 NOTE — PROCEDURE: ADDITIONAL NOTES
Detail Level: Simple
Render Risk Assessment In Note?: no
Additional Notes: Discussed massaging the area 3x a day.

## 2023-05-16 NOTE — PROCEDURE: MIPS QUALITY
Quality 47: Advance Care Plan: Advance Care Planning discussed and documented; advance care plan or surrogate decision maker documented in the medical record.
Detail Level: Detailed
Quality 431: Preventive Care And Screening: Unhealthy Alcohol Use - Screening: Patient not identified as an unhealthy alcohol user when screened for unhealthy alcohol use using a systematic screening method
Quality 110: Preventive Care And Screening: Influenza Immunization: Influenza Immunization Administered during Influenza season
Quality 226: Preventive Care And Screening: Tobacco Use: Screening And Cessation Intervention: Patient screened for tobacco use and is an ex/non-smoker
Quality 111:Pneumonia Vaccination Status For Older Adults: Patient received any pneumococcal conjugate or polysaccharide vaccine on or after their 60th birthday and before the end of the measurement period

## 2025-03-05 NOTE — PROCEDURE: EXCISION
4 Eyes Skin Assessment     NAME:  Yovanny Levine  YOB: 1973  MEDICAL RECORD NUMBER:  [id number]    The patient is being assessed for  Admission    I agree that at least one RN has performed a thorough Head to Toe Skin Assessment on the patient. ALL assessment sites listed below have been assessed.      Areas assessed by both nurses:    Head, Face, Ears, Shoulders, Back, Chest, Arms, Elbows, Hands, Sacrum. Buttock, Coccyx, Ischium, Legs. Feet and Heels, and Under Medical Devices         Does the Patient have a Wound? Yes wound(s) were present on assessment. LDA wound assessment was Initiated and completed by RN    Wounds.... 2 small abrasions on abdomen both lower and upper quad.Wound on right bottom of foot, patient does have wound care 3x a week at home for right foot wound.  Abrasion on the top of his head from getting out of vehicle and hitting his head on the top of the car frame         Pradeep Prevention initiated by RN: Yes  Wound Care Orders initiated by RN: Yes    Pressure Injury (Stage 3,4, Unstageable, DTI, NWPT, and Complex wounds) if present, place Wound referral order by RN under : No    New Ostomies, if present place, Ostomy referral order under : No     Nurse 1 eSignature: Electronically signed by Addis Samano RN on 3/5/25 at 1:41 AM EST    **SHARE this note so that the co-signing nurse can place an eSignature**    Nurse 2 eSignature: Electronically signed by Francisco Rivera RN on 3/5/25 at 4:04 AM EST        O-Z Plasty Text: The defect edges were debeveled with a #15 scalpel blade.  Given the location of the defect, shape of the defect and the proximity to free margins an O-Z plasty (double transposition flap) was deemed most appropriate.  Using a sterile surgical marker, the appropriate transposition flaps were drawn incorporating the defect and placing the expected incisions within the relaxed skin tension lines where possible.    The area thus outlined was incised deep to adipose tissue with a #15 scalpel blade.  The skin margins were undermined to an appropriate distance in all directions utilizing iris scissors.  Hemostasis was achieved with electrocautery.  The flaps were then transposed into place, one clockwise and the other counterclockwise, and anchored with interrupted buried subcutaneous sutures.